# Patient Record
(demographics unavailable — no encounter records)

---

## 2025-04-24 NOTE — ASSESSMENT
[FreeTextEntry1] : Chronic episodic migraine On Zomig rarely/ No new medical problems. Continue with Emgailty.

## 2025-04-24 NOTE — PHYSICAL EXAM
[FreeTextEntry1] : Constitutional: No signs of distress or signs of toxicity. Mental Status: Alert and well oriented. Speech fluent. No aphasia. Fund of knowledge intact. Psychiatric: Mood stable. No cranial nerve abnormality seen. Gait: non antalgic or ataxic. Eyes: no redness or swelling HEENT: intact; no signs of trauma. Neck: No masses noted Pulmonary: no respiratory distress Skin: No rash. Musculoskeletal: Cervical range of motion full in all directions.  Negative Spurling's maneuver.  No spinal or paraspinal tenderness noted.  Thoracic: No spinal or paraspinal tenderness noted.  Lumbar sacral: Range of motion full in all directions.  No spinal or paraspinal tenderness noted.  Straight leg raise tested -0 to 60 degrees bilaterally. Anesthesia Type: 1% lidocaine with 1:100,000 epinephrine and a 1:10 solution of 8.4% sodium bicarbonate

## 2025-04-24 NOTE — HISTORY OF PRESENT ILLNESS
[FreeTextEntry1] : sp breast cancer.  Going to gym.   Headaches doing Emgality and Zomig.  99 % good.  Taking seraquil 50 mgs qhs helping sleep and SAD.